# Patient Record
Sex: FEMALE | Race: WHITE | Employment: STUDENT | URBAN - METROPOLITAN AREA
[De-identification: names, ages, dates, MRNs, and addresses within clinical notes are randomized per-mention and may not be internally consistent; named-entity substitution may affect disease eponyms.]

---
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
## 2021-01-14 NOTE — PATIENT INSTRUCTIONS
Start Adderall XR 10 mg 1 tablet daily in the morning. Take per directions. Healthy diet. Stay active. Start counseling.

## 2021-01-19 ENCOUNTER — MED REC SCAN ONLY (OUTPATIENT)
Dept: FAMILY MEDICINE CLINIC | Facility: CLINIC | Age: 21
End: 2021-01-19

## 2021-02-19 NOTE — PROGRESS NOTES
Sivan Thorne is a 24year old female. cc ADHD, anxiety, itchy back  HPI:   ADHD patient is taking medications regularly. Medication helps  with concentration and focusing . Patient is able to finish his tasks. Patient  Is having better motivation to no sore throat  Neck no neck pain  RESPIRATORY: denies shortness of breath with exertion  CARDIOVASCULAR: denies chest pain on exertion, no palpitations   GI: denies abdominal pain and denies heartburn  NEURO: denies headaches  Psych normal mood    EXAM: plan.  The patient is asked to return in 3 months. The note was dictated using speech recognition software. Accuracy and grammar in transcription may be subject to error.

## 2021-02-19 NOTE — PATIENT INSTRUCTIONS
Continue Adderall at current dose. Start counseling. Healthy diet. Keep good hydration. Use moisturizers to back regularly.

## 2021-06-17 RX ORDER — DEXTROAMPHETAMINE/AMPHETAMINE 10 MG
CAPSULE, EXT RELEASE 24 HR ORAL
Qty: 30 CAPSULE | Refills: 0 | Status: SHIPPED | OUTPATIENT
Start: 2021-06-17 | End: 2021-07-23

## 2021-06-17 NOTE — TELEPHONE ENCOUNTER
LOV: 2/19/21  Future Visit: 6/23/21  Last Rx: 4/22/21 30 caps 0 refills  Last Labs: 10/18/2018  Per protocol to provider

## 2021-06-23 NOTE — PATIENT INSTRUCTIONS
Start Vyvanse 20 mg 1 tablet in the morning. Do not take Adderall at the same time. Healthy diet. Keep good hydration.

## 2021-06-24 NOTE — PROGRESS NOTES
Kayleen Guevara is a 22year old female. cc ADHD, anxiety  HPI:   Patient is coming to the office for ADHD follow-up. She was started on Adderall XR 10 mg daily. Patient was doing well on medication.   Right now she is at home since mid May due to summ neck pain  RESPIRATORY: denies shortness of breath with exertion  CARDIOVASCULAR: denies chest pain on exertion  GI: denies abdominal pain and denies heartburn  NEURO: denies headaches  Psych normal mood.     EXAM:   /74 (BP Location: Left arm, [place]

## 2021-07-06 ENCOUNTER — TELEPHONE (OUTPATIENT)
Dept: FAMILY MEDICINE CLINIC | Facility: CLINIC | Age: 21
End: 2021-07-06

## 2021-07-18 ENCOUNTER — PATIENT MESSAGE (OUTPATIENT)
Dept: FAMILY MEDICINE CLINIC | Facility: CLINIC | Age: 21
End: 2021-07-18

## 2021-07-22 RX ORDER — DEXTROAMPHETAMINE/AMPHETAMINE 10 MG
CAPSULE, EXT RELEASE 24 HR ORAL
Qty: 30 CAPSULE | Refills: 0 | OUTPATIENT
Start: 2021-07-22

## 2021-07-22 NOTE — TELEPHONE ENCOUNTER
Medication(s) to Refill:   Requested Prescriptions     Pending Prescriptions Disp Refills   • ADDERALL XR 10 MG Oral Capsule SR 24 Hr [Pharmacy Med Name: Adderall XR 10 mg capsule,extended release] 30 capsule 0     Sig: TAKE ONE CAPSULE BY MOUTH DAILY   •

## 2021-07-23 RX ORDER — DEXTROAMPHETAMINE SACCHARATE, AMPHETAMINE ASPARTATE MONOHYDRATE, DEXTROAMPHETAMINE SULFATE AND AMPHETAMINE SULFATE 2.5; 2.5; 2.5; 2.5 MG/1; MG/1; MG/1; MG/1
10 CAPSULE, EXTENDED RELEASE ORAL DAILY
Qty: 30 CAPSULE | Refills: 0 | Status: SHIPPED | OUTPATIENT
Start: 2021-07-23 | End: 2021-08-16

## 2021-07-23 NOTE — TELEPHONE ENCOUNTER
From: Gudelia Hunt  To: Tony Duke MD  Sent: 7/18/2021 11:02 AM CDT  Subject: Prescription Question    Hi Dr. Helga Downs,  The Vyvanse I was prescribed was too expensive (as we talked to you about a few weeks ago), and my current prescripti

## 2021-07-23 NOTE — TELEPHONE ENCOUNTER
LOV 06/23/21. Please see message below. I pended her past prescription of adderall to you. Please authorize if appropriate.

## 2021-08-16 RX ORDER — DEXTROAMPHETAMINE/AMPHETAMINE 10 MG
CAPSULE, EXT RELEASE 24 HR ORAL
Qty: 30 CAPSULE | Refills: 0 | Status: SHIPPED | OUTPATIENT
Start: 2021-08-16 | End: 2021-09-23

## 2021-08-16 NOTE — TELEPHONE ENCOUNTER
Given Vyvanse 6-23 but too expensive and changed back to Adderall 7-18. Was told with RTC 1 month, but back on original medication. When is patient due back now? One month Adderall pended. DISCHARGE

## 2021-09-22 NOTE — TELEPHONE ENCOUNTER
LOV: 6/23/21  Future Visit: none  Last Rx: 8/16/21 30 caps 0 refills  Last Labs: 6/26/17  Per protocol to provider

## 2021-09-23 RX ORDER — DEXTROAMPHETAMINE/AMPHETAMINE 10 MG
CAPSULE, EXT RELEASE 24 HR ORAL
Qty: 30 CAPSULE | Refills: 0 | Status: SHIPPED | OUTPATIENT
Start: 2021-09-23 | End: 2021-10-19

## 2021-10-19 RX ORDER — DEXTROAMPHETAMINE/AMPHETAMINE 10 MG
CAPSULE, EXT RELEASE 24 HR ORAL
Qty: 30 CAPSULE | Refills: 0 | Status: SHIPPED | OUTPATIENT
Start: 2021-10-19

## 2021-10-19 NOTE — TELEPHONE ENCOUNTER
Adderall XR 10 mg capsule,extended release    LOV: 06/23/2021 for ADHD    UPCOMING APPT:N/A    LAST REFILL: 09/23/2021  QTY:  30/ 0 REFILLS    Sent [employer] message to make an appt. RX pended, please review if applicable. Thank You!

## 2021-11-24 ENCOUNTER — HOSPITAL ENCOUNTER (OUTPATIENT)
Dept: GENERAL RADIOLOGY | Age: 21
Discharge: HOME | End: 2021-11-24
Attending: FAMILY MEDICINE
Payer: COMMERCIAL

## 2021-11-24 ENCOUNTER — OFFICE VISIT (OUTPATIENT)
Dept: FAMILY MEDICINE CLINIC | Facility: CLINIC | Age: 21
End: 2021-11-24
Payer: COMMERCIAL

## 2021-11-24 VITALS
BODY MASS INDEX: 22.55 KG/M2 | WEIGHT: 137 LBS | TEMPERATURE: 98 F | RESPIRATION RATE: 18 BRPM | SYSTOLIC BLOOD PRESSURE: 108 MMHG | DIASTOLIC BLOOD PRESSURE: 72 MMHG | HEART RATE: 97 BPM | HEIGHT: 65.5 IN | OXYGEN SATURATION: 99 %

## 2021-11-24 DIAGNOSIS — R05.9 COUGH: ICD-10-CM

## 2021-11-24 DIAGNOSIS — R09.89 CHEST CONGESTION: ICD-10-CM

## 2021-11-24 DIAGNOSIS — R05.9 COUGH: Primary | ICD-10-CM

## 2021-11-24 PROCEDURE — 3008F BODY MASS INDEX DOCD: CPT | Performed by: FAMILY MEDICINE

## 2021-11-24 PROCEDURE — 71046 X-RAY EXAM CHEST 2 VIEWS: CPT | Performed by: FAMILY MEDICINE

## 2021-11-24 PROCEDURE — 3074F SYST BP LT 130 MM HG: CPT | Performed by: FAMILY MEDICINE

## 2021-11-24 PROCEDURE — 99214 OFFICE O/P EST MOD 30 MIN: CPT | Performed by: FAMILY MEDICINE

## 2021-11-24 PROCEDURE — 3078F DIAST BP <80 MM HG: CPT | Performed by: FAMILY MEDICINE

## 2021-11-24 RX ORDER — AZITHROMYCIN 250 MG/1
TABLET, FILM COATED ORAL
Qty: 6 TABLET | Refills: 0 | Status: SHIPPED | OUTPATIENT
Start: 2021-11-24 | End: 2021-11-29

## 2021-11-24 RX ORDER — ALBUTEROL SULFATE 90 UG/1
AEROSOL, METERED RESPIRATORY (INHALATION)
COMMUNITY
Start: 2021-11-12 | End: 2021-12-17 | Stop reason: ALTCHOICE

## 2021-11-24 NOTE — PROGRESS NOTES
Nadir Farfan is a 22year old female. cc cough, chest congestion  HPI:   Patient is coming to the office for follow-up from urgent care visit on virtual visit she had for her illness. Patient was diagnosed with Covid on October 14, 2021.   She [place] denies abdominal pain and denies heartburn  NEURO: denies headaches  Psych - normal mood,     EXAM:   /72 (BP Location: Left arm, Patient Position: Sitting, Cuff Size: adult)   Pulse 97   Temp 97.6 °F (36.4 °C) (Oral)   Resp 18   Ht 5' 5.5\" (1.664 m Cough       COMPARISON:  None.       TECHNIQUE:  PA and lateral chest radiographs were obtained.       PATIENT STATED HISTORY: (As transcribed by Technologist)  Pt c/o productive cough and chest congestion for about 3 weeks.            FINDINGS:     LUNGS:

## 2021-11-24 NOTE — PROGRESS NOTES
Covi 10/13 positive, Nov 7 th cough, wet, no fever, 11/11- online - CX- no pneumonia,   Albuterol inhaler, Mucinex,

## 2021-11-24 NOTE — PATIENT INSTRUCTIONS
Do x-ray of your chest.  Start antibiotic today per directions. Take probiotic over-the-counter daily like organic yogurt while taking antibiotic. Drink plenty of fluids. Take Tylenol or ibuprofen as needed for fever or for pain.     Flonase 2 sprays nos

## 2021-12-17 RX ORDER — METHYLPHENIDATE HYDROCHLORIDE 18 MG/1
TABLET ORAL
Qty: 30 TABLET | Refills: 0 | OUTPATIENT
Start: 2021-12-17

## 2021-12-17 NOTE — PATIENT INSTRUCTIONS
Start escitalopram 5 mg 1 tablet daily. Start Concerta 18 mg 1 tablet daily/as needed 1 week later. Keep good hydration. Healthy diet. Follow-up beginning of February 2022.

## 2021-12-17 NOTE — PROGRESS NOTES
Jose Luis Boykin is a 22year old female. Cc ADHD, anxiety  HPI:   Patient is coming for follow-up of ADHD. Started on Adderall XR 10 mg 1 tablet daily. Noticed that the medication makes her more anxious.   There are times which she is doing okay but No       REVIEW OF SYSTEMS:   GENERAL HEALTH: feels well otherwise  SKIN: denies any unusual skin lesions or rashes  HEENT no congestion no runny nose no sore throat  Neck no neck pain  RESPIRATORY: denies shortness of breath with exertion  CARDIOVASCULAR: & Consults:  FLULAVAL INFLUENZA VACCINE QUAD PRESERVATIVE FREE 0.5 ML    The patient indicates understanding of these issues and agrees to the plan. The patient is asked to return in beginning of February 2022. Sooner as needed.   The note was dictated us

## 2022-02-01 ENCOUNTER — PATIENT MESSAGE (OUTPATIENT)
Dept: FAMILY MEDICINE CLINIC | Facility: CLINIC | Age: 22
End: 2022-02-01

## 2022-02-01 RX ORDER — ESCITALOPRAM OXALATE 5 MG/1
TABLET ORAL
Qty: 30 TABLET | Refills: 0 | Status: SHIPPED | OUTPATIENT
Start: 2022-02-01 | End: 2022-03-18

## 2022-02-01 NOTE — TELEPHONE ENCOUNTER
escitalopram 5 mg tablet    Please see pended medications. Please sign if appropriate.       Thank you      Last OV: 12/17/2021 for 30/ refill      Last refill: 12/17/2021

## 2022-02-02 NOTE — TELEPHONE ENCOUNTER
From: Gal Cronin  To: Vasiliy Velasco MD  Sent: 2/1/2022 5:21 PM CST  Subject: Concerta Refill    Hi Dr. Katty Fuchs,  I called [facility] today and tried to refill my Concerta prescription, but they said that they were not able to contact you to request a refill because it had recently been denied. I just looked on MyChart and also noticed that it is not listed as one of my prescriptions. Would it be possible for you to send in a refill to [facility]? Thank you!   Ale Malik

## 2022-02-02 NOTE — TELEPHONE ENCOUNTER
Last OV 12/17/2021 for ADHD. Last refill 12/17/2022 #30 without refills. Pt is due for a follow up for her Escitalopram.  Will call pt to make a follow up appt. Would you like to refill this med prior to appt? Please advise. Thanks.

## 2022-02-03 ENCOUNTER — TELEPHONE (OUTPATIENT)
Dept: FAMILY MEDICINE CLINIC | Facility: CLINIC | Age: 22
End: 2022-02-03

## 2022-02-03 RX ORDER — METHYLPHENIDATE HYDROCHLORIDE 18 MG/1
18 TABLET ORAL EVERY MORNING
Qty: 30 TABLET | Refills: 0 | Status: SHIPPED | OUTPATIENT
Start: 2022-02-03 | End: 2022-03-05

## 2022-02-03 NOTE — TELEPHONE ENCOUNTER
I have called in refill for methylphenidate to patient's pharmacy. We have received a note that she will be this weekend in [place]. I am in the office on Saturday, February 5. Please offer her appointment on the day I can see her at 8:45 AM or at noon. She is due for medication check and it might be easier for her to do it at this time.   Thank you

## 2022-02-03 NOTE — TELEPHONE ENCOUNTER
Pt calling regarding refill request for Methylphenidate HCl ER (CONCERTA) 18 MG Oral Tab CR. Pt states attempted to refill via pharmacy and MyChart but was unable to see medication. 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 Pt requesting refill so pt can get medication this weekend as she is almost out.     Please advise

## 2022-02-03 NOTE — TELEPHONE ENCOUNTER
I tried to call pt. Her voice mail box is full and I was unable to leave a message. I sent a [facility] to pt to call the office about an appointment this Saturday.

## 2022-02-03 NOTE — TELEPHONE ENCOUNTER
Pt calling back regarding [facility] that was sent. States she will not be in [place] this weekend . Pt's father live in [place] and will be picking up prescription to bring to pt's college this weekend. Pt will not be back in [place] for a while. Advised Dr Amy Zamora scheduling out anyway's and we can make appointment for next avail. Pt agreed will make appointment but is going into class so will call back or schedule via [employer].     Please advise

## 2022-03-14 ENCOUNTER — TELEPHONE (OUTPATIENT)
Dept: FAMILY MEDICINE CLINIC | Facility: CLINIC | Age: 22
End: 2022-03-14

## 2022-03-14 NOTE — TELEPHONE ENCOUNTER
Call to Mom/Martha. Reaches identified VM. Per HIPAA consent, LVM requesting call back to ofc tomorrow to confirm if appt on Friday 3/18/22 at 12:00 PM works for pt. Provided call back number and ofc phone hours.

## 2022-03-14 NOTE — TELEPHONE ENCOUNTER
Pt will be in from college for only one day,  Friday, 3/18/2022. Caller asking if pt can be seen for a med check. Please advise.

## 2022-03-14 NOTE — TELEPHONE ENCOUNTER
Pt was scheduled for 9:30 AM on 3/18, pt mom called back to request a later appointment time. States pt will be having a meeting at 10:00 AM and is requesting an appointment time for pt after 11:00 AM.     Please advise if this is possible?

## 2022-04-05 ENCOUNTER — TELEPHONE (OUTPATIENT)
Dept: FAMILY MEDICINE CLINIC | Facility: CLINIC | Age: 22
End: 2022-04-05

## 2022-04-05 RX ORDER — METHYLPHENIDATE HYDROCHLORIDE 18 MG/1
18 TABLET ORAL DAILY
Qty: 30 TABLET | Refills: 0 | Status: SHIPPED | OUTPATIENT
Start: 2022-04-18 | End: 2022-05-18

## 2022-04-05 NOTE — TELEPHONE ENCOUNTER
See below. 3 mos panel sent 3.18  Reporting bottle is lost.  Are you OK in refilling? Pt may need to pay out of pocket since too soon. I pended.

## 2022-04-05 NOTE — TELEPHONE ENCOUNTER
Pt calling regarding prescription of Methylphenidate HCl ER (CONCERTA) 18 MG Oral Tab CR. Pt states while away at camp trip she lost her medication. Pt states attempted to call location, however they were unable to locate pts medication. Pt states unaware how much she had left, however has been off of medication since Saturday. Pt requesting refill if possible. Per pt okay to leave VM if does not answer.     Please advise

## 2022-04-05 NOTE — TELEPHONE ENCOUNTER
I have called new prescription for methylphenidate as requested. It is a controlled substance which is regulated by state rules. If the state would not allow her to fill of the medication she would have to wait for the next time she can fill the prescription to get the medication. In the future be very careful,  if she loses the medication she would not be able to continue taking it until new prescription will be released by the pharmacy. We do not have any  power to override those restrictions.   Thank you

## 2022-06-17 RX ORDER — ESCITALOPRAM OXALATE 5 MG/1
5 TABLET ORAL DAILY
Qty: 30 TABLET | Refills: 0 | Status: SHIPPED | OUTPATIENT
Start: 2022-06-17

## 2022-06-17 NOTE — TELEPHONE ENCOUNTER
escitalopram 5 mg tablet    Please see pended medications. Please sign if appropriate. Thank you        Last OV: 03/18/2022      Last refill: 03/18/2022 for 30/2 refills      Pt asked to RTC in the Summer of 2022. [facility] sent to the pt to call and schedule her appt.

## 2022-07-11 RX ORDER — ESCITALOPRAM OXALATE 5 MG/1
TABLET ORAL
Qty: 30 TABLET | Refills: 0 | Status: SHIPPED | OUTPATIENT
Start: 2022-07-11

## 2022-07-27 RX ORDER — METHYLPHENIDATE HYDROCHLORIDE 18 MG/1
TABLET ORAL
Qty: 15 TABLET | Refills: 0 | Status: SHIPPED | OUTPATIENT
Start: 2022-07-27

## 2022-08-03 NOTE — PATIENT INSTRUCTIONS
Continue Concerta at current dose. Continue citalopram at current dose. Healthy diet. Stay active. Do blood work. Schedule your next follow-up visit around Thanksgiving time.

## 2022-08-15 ENCOUNTER — TELEPHONE (OUTPATIENT)
Dept: FAMILY MEDICINE CLINIC | Facility: CLINIC | Age: 22
End: 2022-08-15

## 2022-08-15 NOTE — TELEPHONE ENCOUNTER
Pt received a 15 day supply of Concerta in July because she was over due to be seen. Now the insurance company is denying the 30 day supply. I called "[employer] at [phone] ID # [id number] and explained the situation to them. I spoke to Ruperto hernandez and she said she would have to initiate a back on track request. This will take about 24 hours. [facility] pharmacy was notified.

## 2022-12-22 ENCOUNTER — LAB ENCOUNTER (OUTPATIENT)
Dept: LAB | Age: 22
End: 2022-12-22
Attending: FAMILY MEDICINE
Payer: COMMERCIAL

## 2022-12-22 DIAGNOSIS — R68.89 COLD FEELING: ICD-10-CM

## 2022-12-22 DIAGNOSIS — D64.9 ANEMIA, UNSPECIFIED TYPE: Primary | ICD-10-CM

## 2022-12-22 DIAGNOSIS — D64.9 ANEMIA, UNSPECIFIED TYPE: ICD-10-CM

## 2022-12-22 LAB
ALBUMIN SERPL-MCNC: 4 G/DL (ref 3.4–5)
ALBUMIN/GLOB SERPL: 1.3 {RATIO} (ref 1–2)
ALP LIVER SERPL-CCNC: 61 U/L
ALT SERPL-CCNC: 18 U/L
ANION GAP SERPL CALC-SCNC: 5 MMOL/L (ref 0–18)
AST SERPL-CCNC: 12 U/L (ref 15–37)
BASOPHILS # BLD AUTO: 0.03 X10(3) UL (ref 0–0.2)
BASOPHILS NFR BLD AUTO: 0.7 %
BILIRUB SERPL-MCNC: 0.3 MG/DL (ref 0.1–2)
BUN BLD-MCNC: 15 MG/DL (ref 7–18)
BUN/CREAT SERPL: 22.1 (ref 10–20)
CALCIUM BLD-MCNC: 8.9 MG/DL (ref 8.5–10.1)
CHLORIDE SERPL-SCNC: 109 MMOL/L (ref 98–112)
CO2 SERPL-SCNC: 25 MMOL/L (ref 21–32)
CREAT BLD-MCNC: 0.68 MG/DL
DEPRECATED HBV CORE AB SER IA-ACNC: 3.8 NG/ML
DEPRECATED RDW RBC AUTO: 41.1 FL (ref 35.1–46.3)
EOSINOPHIL # BLD AUTO: 0.06 X10(3) UL (ref 0–0.7)
EOSINOPHIL NFR BLD AUTO: 1.3 %
ERYTHROCYTE [DISTWIDTH] IN BLOOD BY AUTOMATED COUNT: 13.3 % (ref 11–15)
FASTING STATUS PATIENT QL REPORTED: YES
GFR SERPLBLD BASED ON 1.73 SQ M-ARVRAT: 127 ML/MIN/1.73M2 (ref 60–?)
GLOBULIN PLAS-MCNC: 3 G/DL (ref 2.8–4.4)
GLUCOSE BLD-MCNC: 87 MG/DL (ref 70–99)
HCT VFR BLD AUTO: 37.3 %
HGB BLD-MCNC: 11.8 G/DL
IMM GRANULOCYTES # BLD AUTO: 0.01 X10(3) UL (ref 0–1)
IMM GRANULOCYTES NFR BLD: 0.2 %
IRON SATN MFR SERPL: 7 %
IRON SERPL-MCNC: 29 UG/DL
LYMPHOCYTES # BLD AUTO: 1.68 X10(3) UL (ref 1–4)
LYMPHOCYTES NFR BLD AUTO: 37.4 %
MCH RBC QN AUTO: 26.8 PG (ref 26–34)
MCHC RBC AUTO-ENTMCNC: 31.6 G/DL (ref 31–37)
MCV RBC AUTO: 84.8 FL
MONOCYTES # BLD AUTO: 0.51 X10(3) UL (ref 0.1–1)
MONOCYTES NFR BLD AUTO: 11.4 %
NEUTROPHILS # BLD AUTO: 2.2 X10 (3) UL (ref 1.5–7.7)
NEUTROPHILS # BLD AUTO: 2.2 X10(3) UL (ref 1.5–7.7)
NEUTROPHILS NFR BLD AUTO: 49 %
OSMOLALITY SERPL CALC.SUM OF ELEC: 288 MOSM/KG (ref 275–295)
PLATELET # BLD AUTO: 213 10(3)UL (ref 150–450)
POTASSIUM SERPL-SCNC: 4 MMOL/L (ref 3.5–5.1)
PROT SERPL-MCNC: 7 G/DL (ref 6.4–8.2)
RBC # BLD AUTO: 4.4 X10(6)UL
SODIUM SERPL-SCNC: 139 MMOL/L (ref 136–145)
TIBC SERPL-MCNC: 446 UG/DL (ref 240–450)
TRANSFERRIN SERPL-MCNC: 299 MG/DL (ref 200–360)
TSI SER-ACNC: 1.33 MIU/ML (ref 0.36–3.74)
WBC # BLD AUTO: 4.5 X10(3) UL (ref 4–11)

## 2022-12-22 PROCEDURE — 83540 ASSAY OF IRON: CPT

## 2022-12-22 PROCEDURE — 82728 ASSAY OF FERRITIN: CPT

## 2022-12-22 PROCEDURE — 84443 ASSAY THYROID STIM HORMONE: CPT

## 2022-12-22 PROCEDURE — 84466 ASSAY OF TRANSFERRIN: CPT

## 2022-12-22 PROCEDURE — 80053 COMPREHEN METABOLIC PANEL: CPT

## 2022-12-22 PROCEDURE — 85025 COMPLETE CBC W/AUTO DIFF WBC: CPT

## 2022-12-23 ENCOUNTER — TELEPHONE (OUTPATIENT)
Dept: FAMILY MEDICINE CLINIC | Facility: CLINIC | Age: 22
End: 2022-12-23

## 2022-12-23 DIAGNOSIS — D50.9 IRON DEFICIENCY ANEMIA, UNSPECIFIED IRON DEFICIENCY ANEMIA TYPE: Primary | ICD-10-CM

## 2022-12-23 RX ORDER — FERROUS SULFATE 325(65) MG
325 TABLET ORAL
COMMUNITY
Start: 2022-12-23

## 2022-12-23 RX ORDER — ASCORBIC ACID 500 MG
500 TABLET ORAL DAILY
COMMUNITY
Start: 2022-12-23

## 2023-05-05 NOTE — PROGRESS NOTES
Carissa Bermeo is a 24year old female. cc discuss test results from psychologist.  HPI:   Patient come to the office to discuss test results from psychologist Dr. Loir Recinos. She had those test done yesterday.   It showed that the patient has symptoms co problems.     EXAM:   /70 (BP Location: Right arm, Patient Position: Sitting, Cuff Size: adult)   Pulse 89   Temp 97.5 °F (36.4 °C) (Oral)   Resp 16   Ht 5' 5.5\" (1.664 m)   Wt 140 lb (63.5 kg)   LMP 11/26/2020 (Approximate)   Breastfeeding No   BMI Xenograft Text: The defect edges were debeveled with a #15 scalpel blade.  Given the location of the defect, shape of the defect and the proximity to free margins a xenograft was deemed most appropriate.  The graft was then trimmed to fit the size of the defect.  The graft was then placed in the primary defect and oriented appropriately.

## 2023-08-09 RX ORDER — ESCITALOPRAM OXALATE 5 MG/1
5 TABLET ORAL DAILY
Qty: 90 TABLET | Refills: 0 | OUTPATIENT
Start: 2023-08-09

## 2023-08-09 NOTE — TELEPHONE ENCOUNTER
Pt requesting a refill for       escitalopram 5 MG Oral Tab 90 tablet 1 11/25/2022    Sig:   Take 1 tablet (5 mg total) by mouth daily.      Route:   Oral     Order #:   [id number]         Pharmacy    [facility], [address], [phone]

## 2023-09-01 ENCOUNTER — LAB ENCOUNTER (OUTPATIENT)
Dept: LAB | Age: 23
End: 2023-09-01
Attending: FAMILY MEDICINE
Payer: COMMERCIAL

## 2023-09-01 DIAGNOSIS — D50.9 IRON DEFICIENCY ANEMIA, UNSPECIFIED IRON DEFICIENCY ANEMIA TYPE: ICD-10-CM

## 2023-09-01 LAB
BASOPHILS # BLD AUTO: 0.03 X10(3) UL (ref 0–0.2)
BASOPHILS NFR BLD AUTO: 0.7 %
DEPRECATED HBV CORE AB SER IA-ACNC: 4.5 NG/ML
EOSINOPHIL # BLD AUTO: 0.07 X10(3) UL (ref 0–0.7)
EOSINOPHIL NFR BLD AUTO: 1.6 %
ERYTHROCYTE [DISTWIDTH] IN BLOOD BY AUTOMATED COUNT: 13.4 %
HCT VFR BLD AUTO: 41.8 %
HGB BLD-MCNC: 13.8 G/DL
IMM GRANULOCYTES # BLD AUTO: 0.01 X10(3) UL (ref 0–1)
IMM GRANULOCYTES NFR BLD: 0.2 %
IRON SATN MFR SERPL: 18 %
IRON SERPL-MCNC: 92 UG/DL
LYMPHOCYTES # BLD AUTO: 1.47 X10(3) UL (ref 1–4)
LYMPHOCYTES NFR BLD AUTO: 32.8 %
MCH RBC QN AUTO: 28.3 PG (ref 26–34)
MCHC RBC AUTO-ENTMCNC: 33 G/DL (ref 31–37)
MCV RBC AUTO: 85.8 FL
MONOCYTES # BLD AUTO: 0.64 X10(3) UL (ref 0.1–1)
MONOCYTES NFR BLD AUTO: 14.3 %
NEUTROPHILS # BLD AUTO: 2.26 X10 (3) UL (ref 1.5–7.7)
NEUTROPHILS # BLD AUTO: 2.26 X10(3) UL (ref 1.5–7.7)
NEUTROPHILS NFR BLD AUTO: 50.4 %
PLATELET # BLD AUTO: 232 10(3)UL (ref 150–450)
RBC # BLD AUTO: 4.87 X10(6)UL
TIBC SERPL-MCNC: 504 UG/DL (ref 240–450)
TRANSFERRIN SERPL-MCNC: 338 MG/DL (ref 200–360)
WBC # BLD AUTO: 4.5 X10(3) UL (ref 4–11)

## 2023-09-01 PROCEDURE — 83540 ASSAY OF IRON: CPT

## 2023-09-01 PROCEDURE — 83550 IRON BINDING TEST: CPT

## 2023-09-01 PROCEDURE — 85025 COMPLETE CBC W/AUTO DIFF WBC: CPT

## 2023-09-01 PROCEDURE — 82728 ASSAY OF FERRITIN: CPT

## 2023-09-04 ENCOUNTER — PATIENT MESSAGE (OUTPATIENT)
Dept: FAMILY MEDICINE CLINIC | Facility: CLINIC | Age: 23
End: 2023-09-04

## 2023-09-05 NOTE — TELEPHONE ENCOUNTER
I am so sorry , not sure how it happened. I called in today prescription for Lexapro 10 mg to her pharmacy. If she has 5 mg she can use it up by taking 2 of those a day(  which was going to give her total dose for the of 10 mg.). Thanks.

## 2023-09-05 NOTE — TELEPHONE ENCOUNTER
From: Fidel Nur  To: Marialuisa Sifuentes MD  Sent: 9/4/2023 1:54 PM CDT  Subject: Madison Nieto,     At our appointment on Friday, 9/1, you said you sent information to [address] [place] [address] about increasing my dose of Lexapro to 10mg. I filled the prescription right after my appointment, and they gave me 30 days of 5mg. Is this correct? Thank you!   Therese Sneed

## 2024-01-30 ENCOUNTER — LAB ENCOUNTER (OUTPATIENT)
Dept: LAB | Age: 24
End: 2024-01-30
Attending: FAMILY MEDICINE
Payer: COMMERCIAL

## 2024-01-30 DIAGNOSIS — D50.9 IRON DEFICIENCY ANEMIA, UNSPECIFIED IRON DEFICIENCY ANEMIA TYPE: ICD-10-CM

## 2024-01-30 LAB
BASOPHILS # BLD AUTO: 0.05 X10(3) UL (ref 0–0.2)
BASOPHILS NFR BLD AUTO: 1.2 %
DEPRECATED HBV CORE AB SER IA-ACNC: 4.8 NG/ML
EOSINOPHIL # BLD AUTO: 0.09 X10(3) UL (ref 0–0.7)
EOSINOPHIL NFR BLD AUTO: 2.2 %
ERYTHROCYTE [DISTWIDTH] IN BLOOD BY AUTOMATED COUNT: 13.4 %
HCT VFR BLD AUTO: 38 %
HGB BLD-MCNC: 12.4 G/DL
IMM GRANULOCYTES # BLD AUTO: 0.01 X10(3) UL (ref 0–1)
IMM GRANULOCYTES NFR BLD: 0.2 %
IRON SATN MFR SERPL: 18 %
IRON SERPL-MCNC: 84 UG/DL
LYMPHOCYTES # BLD AUTO: 1.61 X10(3) UL (ref 1–4)
LYMPHOCYTES NFR BLD AUTO: 40 %
MCH RBC QN AUTO: 27.1 PG (ref 26–34)
MCHC RBC AUTO-ENTMCNC: 32.6 G/DL (ref 31–37)
MCV RBC AUTO: 83.2 FL
MONOCYTES # BLD AUTO: 0.42 X10(3) UL (ref 0.1–1)
MONOCYTES NFR BLD AUTO: 10.4 %
NEUTROPHILS # BLD AUTO: 1.85 X10 (3) UL (ref 1.5–7.7)
NEUTROPHILS # BLD AUTO: 1.85 X10(3) UL (ref 1.5–7.7)
NEUTROPHILS NFR BLD AUTO: 46 %
PLATELET # BLD AUTO: 220 10(3)UL (ref 150–450)
RBC # BLD AUTO: 4.57 X10(6)UL
TIBC SERPL-MCNC: 472 UG/DL (ref 240–450)
TRANSFERRIN SERPL-MCNC: 317 MG/DL (ref 200–360)
WBC # BLD AUTO: 4 X10(3) UL (ref 4–11)

## 2024-01-30 PROCEDURE — 85025 COMPLETE CBC W/AUTO DIFF WBC: CPT

## 2024-01-30 PROCEDURE — 83550 IRON BINDING TEST: CPT

## 2024-01-30 PROCEDURE — 83540 ASSAY OF IRON: CPT

## 2024-01-30 PROCEDURE — 82728 ASSAY OF FERRITIN: CPT

## 2024-04-22 ENCOUNTER — OFFICE VISIT (OUTPATIENT)
Dept: FAMILY MEDICINE CLINIC | Facility: CLINIC | Age: 24
End: 2024-04-22
Payer: COMMERCIAL

## 2024-04-22 VITALS
SYSTOLIC BLOOD PRESSURE: 112 MMHG | WEIGHT: 154.81 LBS | HEART RATE: 96 BPM | BODY MASS INDEX: 25.48 KG/M2 | HEIGHT: 65.5 IN | TEMPERATURE: 97 F | DIASTOLIC BLOOD PRESSURE: 70 MMHG | RESPIRATION RATE: 18 BRPM

## 2024-04-22 DIAGNOSIS — Z00.00 PHYSICAL EXAM, ANNUAL: Primary | ICD-10-CM

## 2024-04-22 DIAGNOSIS — Z13.89 SCREENING FOR GENITOURINARY CONDITION: ICD-10-CM

## 2024-04-22 DIAGNOSIS — F41.1 GAD (GENERALIZED ANXIETY DISORDER): ICD-10-CM

## 2024-04-22 DIAGNOSIS — Z00.00 LABORATORY EXAMINATION ORDERED AS PART OF A ROUTINE GENERAL MEDICAL EXAMINATION: ICD-10-CM

## 2024-04-22 DIAGNOSIS — D50.9 IRON DEFICIENCY ANEMIA, UNSPECIFIED IRON DEFICIENCY ANEMIA TYPE: ICD-10-CM

## 2024-04-22 PROCEDURE — 3078F DIAST BP <80 MM HG: CPT | Performed by: FAMILY MEDICINE

## 2024-04-22 PROCEDURE — 96127 BRIEF EMOTIONAL/BEHAV ASSMT: CPT | Performed by: FAMILY MEDICINE

## 2024-04-22 PROCEDURE — 99213 OFFICE O/P EST LOW 20 MIN: CPT | Performed by: FAMILY MEDICINE

## 2024-04-22 PROCEDURE — 99395 PREV VISIT EST AGE 18-39: CPT | Performed by: FAMILY MEDICINE

## 2024-04-22 PROCEDURE — 3074F SYST BP LT 130 MM HG: CPT | Performed by: FAMILY MEDICINE

## 2024-04-22 PROCEDURE — 90471 IMMUNIZATION ADMIN: CPT | Performed by: FAMILY MEDICINE

## 2024-04-22 PROCEDURE — 3008F BODY MASS INDEX DOCD: CPT | Performed by: FAMILY MEDICINE

## 2024-04-22 PROCEDURE — 90651 9VHPV VACCINE 2/3 DOSE IM: CPT | Performed by: FAMILY MEDICINE

## 2024-04-22 RX ORDER — ESCITALOPRAM OXALATE 10 MG/1
10 TABLET ORAL DAILY
Qty: 30 TABLET | Refills: 5 | Status: SHIPPED | OUTPATIENT
Start: 2024-04-22

## 2024-04-22 NOTE — PROGRESS NOTES
HPI:   Fallon Kessler is a 24 year old female who presents for a complete physical exam. Symptoms: denies discharge, itching, burning or dysuria. Patient got new job.  Excited about that.  Was planning for [employer].    Patient is taking escitalopram 10 mg for anxiety.  She is doing well mood is stable.  Happy with getting new job.  Needs refill.  PHQ-9 score came back at 0.  ANDREW-7 patient says that over the last week several days she is feeling nervous anxious on the edge.  ANDREW-7 score came back at 1.      Had low iron before.  Does not take supplements regularly check blood work for recommendation pending test results.  Needs HPV vaccination update.  Immunization History   Administered Date(s) Administered    Covid-19 Vaccine Pfizer 30 mcg/0.3 ml 03/22/2021, 04/10/2021, 01/13/2022    Covid-19 Vaccine Pfizer Bivalent 30mcg/0.3mL 03/06/2023    DTAP 05/23/2001, 07/25/2001, 10/03/2001, 10/16/2002, 04/03/2006    FLULAVAL 6 months & older 0.5 ml Prefilled syringe (99064) 12/17/2021, 11/25/2022    FLUZONE 6 months and older PFS 0.5 ml ([id number]) 10/29/2018, 11/20/2023    HEP A 04/05/2010, 04/18/2012    HEP B 05/23/2001, 07/25/2001, 10/16/2002    HIB 05/23/2001, 07/25/2001, 10/03/2001, 06/28/2002    Hpv Virus Vaccine 9 Vee Im 08/03/2022    Influenza 11/04/2011    Influenza(Afluria)0.5ml QIV PFS 09/12/2020    MMR 06/28/2002, 04/03/2006    Meningococcal-Menactra 04/18/2012, 07/27/2018    OPV 05/23/2001, 07/25/2001, 10/16/2002, 04/03/2006    TDAP 04/18/2012, 08/03/2022      Wt Readings from Last 6 Encounters:   11/20/23 145 lb (65.8 kg)   09/01/23 137 lb (62.1 kg)   11/25/22 133 lb (60.3 kg)   08/03/22 129 lb (58.5 kg)   03/18/22 131 lb (59.4 kg)   12/17/21 133 lb 6 oz (60.5 kg)     There is no height or weight on file to calculate BMI.     AST (U/L)   Date Value   12/22/2022 12 (L)   06/26/2017 26   02/15/2017 22     ALT (U/L)   Date Value   12/22/2022 18   06/26/2017 20   02/15/2017 21      No results found for:  \"GLUCOSE\"     Current Outpatient Medications   Medication Sig Dispense Refill    escitalopram 10 MG Oral Tab Take 1 tablet (10 mg total) by mouth daily. 30 tablet 5    Ferrous Sulfate 325 (65 Fe) MG Oral Tab Take 1 tablet (325 mg total) by mouth daily with breakfast.      Vitamin C 500 MG Oral Tab Take 1 tablet (500 mg total) by mouth daily.        Past Medical History:    Varicella without mention of complication      Past Surgical History:   Procedure Laterality Date    Remove tonsils/adenoids,<12 y/o  12/29/09    Performed by POLLY TEMPLETON at [facility], [facility]    Tonsillectomy        No family history on file.   Social History:   Social History     Socioeconomic History    Marital status: Single   Tobacco Use    Smoking status: Never    Smokeless tobacco: Never   Vaping Use    Vaping status: Never Used   Substance and Sexual Activity    Alcohol use: No    Drug use: No    Sexual activity: Never   Other Topics Concern    Caffeine Concern No    Exercise Yes     Comment: daily in school    Seat Belt Yes     Occ:office.  no Children: none  Exercise: walking.  Diet: watches calories closely     REVIEW OF SYSTEMS:   GENERAL: feels well otherwise  SKIN: denies any unusual skin lesions  EYES:denies blurred vision or double vision  HEENT: denies nasal congestion, sinus pain or ST  LUNGS: denies shortness of breath with exertion  CARDIOVASCULAR: denies chest pain on exertion  GI: denies abdominal pain,denies heartburn  : denies dysuria, vaginal discharge or itching,periods regular   MUSCULOSKELETAL: denies back pain  NEURO: denies headaches  PSYCHE: denies depression or anxiety  HEMATOLOGIC: denies hx of anemia  ENDOCRINE: denies thyroid history  ALL/ASTHMA: denies hx of allergy or asthma    EXAM:   There were no vitals taken for this visit.  There is no height or weight on file to calculate BMI.   GENERAL: well developed, well nourished,in no apparent distress  SKIN: no rashes,no suspicious  lesions  HEENT: atraumatic, normocephalic,ears and throat are clear  EYES:PERRLA, EOMI, normal optic disk,conjunctiva are clear  NECK: supple,no adenopathy,no bruits  CHEST: no chest tenderness  BREAST: no dominant or suspicious mass  LUNGS: clear to auscultation  CARDIO: RRR without murmur  GI: good BS's,no masses, HSM or tenderness  :deferred  RECTAL: Deferred  MUSCULOSKELETAL: back is not tender,FROM of the back  EXTREMITIES: no cyanosis, clubbing or edema  NEURO: Oriented times three,cranial nerves are intact,motor and sensory are grossly intact    ASSESSMENT AND PLAN:   Fallon Kessler is a 24 year old female who presents for a complete physical exam.  Encounter Diagnoses   Name Primary?    Physical exam, annual Yes    Laboratory examination ordered as part of a routine general medical examination     Iron deficiency anemia, unspecified iron deficiency anemia type     Screening for genitourinary condition     ANDREW (generalized anxiety disorder)        Orders Placed This Encounter   Procedures    CBC With Differential With Platelet    Comp Metabolic Panel (14)    Iron And Tibc    Lipid Panel    Ferritin    Urinalysis with Culture Reflex    TSH W Reflex To Free T4    Human Papillomavirus 9-valent vaccine, Recombinant (Gardasil 9) HPV 9 [51205]       Meds & Refills for this Visit:  Requested Prescriptions     Signed Prescriptions Disp Refills    escitalopram 10 MG Oral Tab 30 tablet 5     Sig: Take 1 tablet (10 mg total) by mouth daily.     Call [phone] to schedule fasting labs.   Continue escitalopram at current dose.  Increase activity.  Healthy diet.  Keep good hydration.    Imaging & Consults:  HPV HUMAN PAPILLOMA VIRUS VACC 9 LAKE 3 DOSE IM   Pap and pelvic deferred . self breast exam explained. Health maintenance, will check fasting Lipids, CMP, and CBC. Pt  will be at 46 referred for screening colonoscopy. Pt' s weight is There is no height or weight on file to calculate BMI., recommended low carb  diet and aerobic exercise 30 minutes three times weekly.  The patient indicates understanding of these issues and agrees to the plan.  The patient is asked to return for CPX in 1 year.  Medication check in 6 months.

## 2024-04-22 NOTE — PATIENT INSTRUCTIONS
Call [phone] to schedule fasting labs.   Continue escitalopram at current dose.  Increase activity.  Healthy diet.  Keep good hydration.    Dear Patients,  At [facility], patient care is our priority. Please review and acknowledge our office refill policy so we can best serve you.  EMG 36 REFILL POLICY  Allow 3 business days for refills; controlled substances may take longer.  Contact your pharmacy at least 5-7 business days prior to running out of medication and have them send an electronic request or submit through the \"request refill\" option thru your [employer] account. No need to do both, as multiple requests will create an automated [employer] message to notify of a denial for one of the duplicated requests, causing you undue confusion.   Refills are NOT addressed on weekends; covering physicians do not authorize routine medications on weekends.  No narcotics or controlled substances are refilled after noon on Fridays or by on call physicians.  By law, narcotics cannot be faxed or phoned into your pharmacy.  If your prescription is due for a refill, you may be due for a follow up appointment. Please call our office at [phone] to make an appointment or schedule an appointment via [employer].  To best provide you care, patients receiving routine medications need to be seen at least twice a year. Patients receiving narcotic/controlled substance medications need to be seen at least once every 3 months.  In the event that your preferred pharmacy does not have the requested medication in stock (ie Backordered), it is your responsibility to find another pharmacy that has the requested medication available. We will gladly send a new prescription to that pharmacy at your request.  controlled substances may not be able to be filled out of state due to license restrictions.  If you have a planned trip, it's best to call your pharmacy at least 5-7 business days to prevent any delays in your medication  refill.    Thank you for your understanding of this important matter.  Sincerely,    [facility], [address] [place] [id number]  Dept: [phone]

## 2024-05-08 ENCOUNTER — HOSPITAL ENCOUNTER (EMERGENCY)
Facility: HOSPITAL | Age: 24
Discharge: LEFT BEFORE DISCHARGE | End: 2024-05-08
Payer: COMMERCIAL

## 2024-12-05 DIAGNOSIS — F41.1 GAD (GENERALIZED ANXIETY DISORDER): ICD-10-CM

## 2024-12-06 NOTE — TELEPHONE ENCOUNTER
[facility] sent to patient reminding her to schedule a med check.     Last Office Visit: 4/22/24  Last Refill: 4/22/24  Return to Clinic: 6 months   Protocol: failed   NOV: n/a   Requested Prescriptions     Pending Prescriptions Disp Refills    ESCITALOPRAM 10 MG Oral Tab [Pharmacy Med Name: escitalopram 10 mg tablet] 30 tablet 5     Sig: TAKE ONE TABLET BY MOUTH DAILY         Please approve if appropriate.     Thank you!

## 2024-12-09 RX ORDER — ESCITALOPRAM OXALATE 10 MG/1
10 TABLET ORAL DAILY
Qty: 30 TABLET | Refills: 0 | Status: SHIPPED | OUTPATIENT
Start: 2024-12-09

## 2024-12-16 ENCOUNTER — OFFICE VISIT (OUTPATIENT)
Dept: FAMILY MEDICINE CLINIC | Facility: CLINIC | Age: 24
End: 2024-12-16
Payer: COMMERCIAL

## 2024-12-16 VITALS
HEART RATE: 92 BPM | DIASTOLIC BLOOD PRESSURE: 72 MMHG | TEMPERATURE: 97 F | HEIGHT: 65.5 IN | RESPIRATION RATE: 14 BRPM | SYSTOLIC BLOOD PRESSURE: 110 MMHG | BODY MASS INDEX: 26.01 KG/M2 | WEIGHT: 158 LBS

## 2024-12-16 DIAGNOSIS — F41.1 GAD (GENERALIZED ANXIETY DISORDER): ICD-10-CM

## 2024-12-16 DIAGNOSIS — D50.9 IRON DEFICIENCY ANEMIA, UNSPECIFIED IRON DEFICIENCY ANEMIA TYPE: Primary | ICD-10-CM

## 2024-12-16 PROCEDURE — 3008F BODY MASS INDEX DOCD: CPT | Performed by: FAMILY MEDICINE

## 2024-12-16 PROCEDURE — 99213 OFFICE O/P EST LOW 20 MIN: CPT | Performed by: FAMILY MEDICINE

## 2024-12-16 PROCEDURE — 3078F DIAST BP <80 MM HG: CPT | Performed by: FAMILY MEDICINE

## 2024-12-16 PROCEDURE — 3074F SYST BP LT 130 MM HG: CPT | Performed by: FAMILY MEDICINE

## 2024-12-16 PROCEDURE — G2211 COMPLEX E/M VISIT ADD ON: HCPCS | Performed by: FAMILY MEDICINE

## 2024-12-16 RX ORDER — ESCITALOPRAM OXALATE 10 MG/1
10 TABLET ORAL DAILY
Qty: 90 TABLET | Refills: 1 | Status: SHIPPED | OUTPATIENT
Start: 2024-12-16

## 2024-12-16 NOTE — PATIENT INSTRUCTIONS
Continue current meds.   Healthy diet.  Stay active.     Refill policies:      Allow 3 business days for refills; controlled substances may take longer.  Contact your pharmacy at least 5-7 business days prior to running out of medication and have them send an electronic request or submit through the \"request refill\" option thru your [employer] account. No need to do both, as multiple requests will create an automated [employer] message to notify of a denial for one of the duplicated requests, causing you undue confusion.   Refills are NOT addressed on weekends; covering physicians do not authorize routine medications on weekends.  No narcotics or controlled substances are refilled after noon on Fridays or by on call physicians.  By law, narcotics cannot be faxed or phoned into your pharmacy.  If your prescription is due for a refill, you may be due for a follow up appointment. Please call our office at [phone] to make an appointment or schedule an appointment via [employer].  To best provide you care, patients receiving routine medications need to be seen at least twice a year. Patients receiving narcotic/controlled substance medications need to be seen at least once every 3 months.  In the event that your preferred pharmacy does not have the requested medication in stock (ie Backordered), it is your responsibility to find another pharmacy that has the requested medication available. We will gladly send a new prescription to that pharmacy at your request.  controlled substances may not be able to be filled out of state due to license restrictions.  If you have a planned trip, it's best to call your pharmacy at least 5-7 business days to prevent any delays in your medication refill.    Scheduling Tests:    If your physician has ordered radiology tests such as MRI or CT scans, please contact Central Scheduling at [phone] right away to schedule the test.  Once scheduled, the [facility] Centralized Referral Team will work  with your insurance carrier to obtain pre-certification or prior authorization.  Depending on your insurance carrier, approval may take 3-10 days.  It is highly recommended patients assure they have received an authorization before having a test performed.  If test is done without insurance authorization, patient may be responsible for the entire amount billed.      Precertification and Prior Authorizations:  If your physician has recommended that you have a procedure or additional testing performed the [facility] Centralized Referral Team will contact your insurance carrier to obtain pre-certification or prior authorization.    You are strongly encouraged to contact your insurance carrier to verify that your procedure/test has been approved and is a COVERED benefit.  Although the [facility] Centralized Referral Team does its due diligence, the insurance carrier gives the disclaimer that \"Although the procedure is authorized, this does not guarantee payment.\"    Ultimately the patient is responsible for payment.   Thank you for your understanding in this matter.  Paperwork Completion:  If you require FMLA or disability paperwork for your recovery, please make sure to either drop it off or have it faxed to our office at [phone]. Be sure the form has your name and date of birth on it.  The form will be faxed to our Forms Department and they will complete it for you.  There is a 25$ fee for all forms that need to be filled out.  Please be aware there is a 10-14 day turnaround time.  You will need to sign a release of information (INES) form if your paperwork does not come with one.  You may call the Forms Department with any questions at [phone].  Their fax number is [phone].

## 2024-12-16 NOTE — PROGRESS NOTES
Fallon Kessler is a 24 year old female.  Anxiety, anemia  HPI:   Anxiety patient is taking citalopram 20 mg daily doing well with medication mood is stable.  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  PHQ-9 score came back 0    On ANDREW-7 patient says that over the last 2 weeks several days she is feeling nervous anxious on the edge and becoming easily annoyed or irritable.  ANDREW-7 score came back at 2.  Will continue citalopram at current dose.  Current Outpatient Medications   Medication Sig Dispense Refill    escitalopram 10 MG Oral Tab Take 1 tablet (10 mg total) by mouth daily. Keep an appointment as scheduled. 90 tablet 1    Ferrous Sulfate 325 (65 Fe) MG Oral Tab Take 1 tablet (325 mg total) by mouth daily with breakfast.      Vitamin C 500 MG Oral Tab Take 1 tablet (500 mg total) by mouth daily.        Past Medical History:    Varicella without mention of complication      Social History:  Social History     Socioeconomic History    Marital status: Single   Tobacco Use    Smoking status: Never    Smokeless tobacco: Never   Vaping Use    Vaping status: Never Used   Substance and Sexual Activity    Alcohol use: No    Drug use: No    Sexual activity: Never   Other Topics Concern    Caffeine Concern No    Exercise Yes     Comment: daily in school    Seat Belt Yes        REVIEW OF SYSTEMS:   GENERAL HEALTH: feels well otherwise  SKIN: denies any unusual skin lesions or rashes  HEENT no congestion no runny nose no sore throat  Neck no neck pain   RESPIRATORY: denies shortness of breath with exertion  CARDIOVASCULAR: denies chest pain on exertion  GI: denies abdominal pain and denies heartburn  NEURO: denies headaches  Psych normal mood.    EXAM:   /72 (BP Location: Left arm, Patient Position: Sitting, Cuff Size: adult)   Pulse 92   Temp 97 °F (36.1 °C) (Temporal)   Resp 14   Ht 5' 5.5\" (1.664 m)   Wt 158 lb (71.7 kg)   LMP 03/30/2024 (Exact  Date)   BMI 25.89 kg/m²   GENERAL: well developed, well nourished,in no apparent distress  SKIN: no rashes,no suspicious lesions  HEENT: atraumatic, normocephalic,ears and throat are clear  NECK: supple,no adenopathy,  LUNGS: clear to auscultation  CARDIO: RRR without murmur  GI: good BS's,no masses, HSM or tenderness  EXTREMITIES: no  edema  Psychiatric - alert  and oriented x3, normal mood      ASSESSMENT AND PLAN:     Encounter Diagnoses   Name Primary?    ANDREW (generalized anxiety disorder)     Iron deficiency anemia, unspecified iron deficiency anemia type Yes       No orders of the defined types were placed in this encounter.      Meds & Refills for this Visit:  Requested Prescriptions     Signed Prescriptions Disp Refills    escitalopram 10 MG Oral Tab 90 tablet 1     Sig: Take 1 tablet (10 mg total) by mouth daily. Keep an appointment as scheduled.   Continue current meds.   Healthy diet.  Stay active.   Schedule physical for April 2025 to fasting blood work 1 week prior to visit.    Imaging & Consults:  None   The patient indicates understanding of these issues and agrees to the plan.  The patient is asked to return in April 2025 for complete physical.

## 2025-05-28 ENCOUNTER — OFFICE VISIT (OUTPATIENT)
Dept: FAMILY MEDICINE CLINIC | Facility: CLINIC | Age: 25
End: 2025-05-28
Payer: COMMERCIAL

## 2025-05-28 VITALS
TEMPERATURE: 97 F | RESPIRATION RATE: 16 BRPM | HEIGHT: 65.5 IN | BODY MASS INDEX: 25.52 KG/M2 | WEIGHT: 155 LBS | DIASTOLIC BLOOD PRESSURE: 70 MMHG | SYSTOLIC BLOOD PRESSURE: 110 MMHG | HEART RATE: 94 BPM

## 2025-05-28 DIAGNOSIS — Z00.00 PHYSICAL EXAM, ANNUAL: Primary | ICD-10-CM

## 2025-05-28 DIAGNOSIS — Z13.89 SCREENING FOR GENITOURINARY CONDITION: ICD-10-CM

## 2025-05-28 DIAGNOSIS — D50.9 IRON DEFICIENCY ANEMIA, UNSPECIFIED IRON DEFICIENCY ANEMIA TYPE: ICD-10-CM

## 2025-05-28 DIAGNOSIS — Z00.00 LABORATORY EXAMINATION ORDERED AS PART OF A ROUTINE GENERAL MEDICAL EXAMINATION: ICD-10-CM

## 2025-05-28 DIAGNOSIS — Z78.9 VEGETARIAN DIET: ICD-10-CM

## 2025-05-28 DIAGNOSIS — E55.9 VITAMIN D DEFICIENCY: ICD-10-CM

## 2025-05-28 PROCEDURE — 3074F SYST BP LT 130 MM HG: CPT | Performed by: FAMILY MEDICINE

## 2025-05-28 PROCEDURE — 3008F BODY MASS INDEX DOCD: CPT | Performed by: FAMILY MEDICINE

## 2025-05-28 PROCEDURE — 99395 PREV VISIT EST AGE 18-39: CPT | Performed by: FAMILY MEDICINE

## 2025-05-28 PROCEDURE — 3078F DIAST BP <80 MM HG: CPT | Performed by: FAMILY MEDICINE

## 2025-05-28 NOTE — PROGRESS NOTES
HPI:   Fallon Kessler is a 25 year old female who presents for a complete physical exam.    The following individual(s) verbally consented to be recorded using ambient AI listening technology and understand that they can each withdraw their consent to this listening technology at any point by asking the clinician to turn off or pause the recording:    Patient name: Fallon Kessler    History of Present Illness  Fallon Kessler is a 25 year old female who presents for an annual physical exam.    She has experienced a weight decrease from 161 pounds in February to 155 pounds currently, which she attributes to possible fluctuations and changes in clothing. She has been attempting to increase her physical activity by walking more and occasionally using a stationary bike at home, though she has not been consistent with the latter.    Her diet is described as 'decent,' and she continues to live with her parents, which she feels helps her maintain a healthy diet. She remains vegetarian and ensures she includes protein sources such as eggs, cheese, milk, nuts, and beans. She has been on iron pills but has not taken them recently due to running out.    She has a history of low vitamin D levels and is unsure about her current dietary adequacy. She reports normal energy levels and denies feeling tired or fatigued. She sleeps 7-8 hours per night and maintains good hydration.    No chest pain, shortness of breath, or other respiratory symptoms. Bowel movements and urination are normal, and she reports no skin issues or moles.    Her vaccinations are up to date, including tetanus in 2022, HPV, and hepatitis A. She anticipates getting a flu shot and COVID vaccine in the fall.    Symptoms: denies discharge, itching, burning or dysuria. Patient got new job.  Excited about that.  Was planning for [employer].    Had low iron before.  Check blood work.     Patient has vegetarian diet.  Will check B12  level.    Immunization History   Administered Date(s) Administered    Covid-19 Vaccine Pfizer 30 mcg/0.3 ml 03/22/2021, 04/10/2021, 01/13/2022    Covid-19 Vaccine Pfizer Bivalent 30mcg/0.3mL 03/06/2023    DTAP 05/23/2001, 07/25/2001, 10/03/2001, 10/16/2002, 04/03/2006    FLULAVAL 6 months & older 0.5 ml Prefilled syringe (42347) 12/17/2021, 11/25/2022    FLUZONE 6 months and older PFS 0.5 ml ([id number]) 10/29/2018, 11/20/2023    HEP A 04/05/2010, 04/18/2012    HEP B 05/23/2001, 07/25/2001, 10/16/2002    HIB 05/23/2001, 07/25/2001, 10/03/2001, 06/28/2002    Hpv Virus Vaccine 9 Vee Im 08/03/2022, 04/22/2024, 02/24/2025    Influenza 11/04/2011    Influenza(Afluria)0.5ml QIV PFS 09/12/2020    MMR 06/28/2002, 04/03/2006    Meningococcal-Menactra 04/18/2012, 07/27/2018    OPV 05/23/2001, 07/25/2001, 10/16/2002, 04/03/2006    TDAP 04/18/2012, 08/03/2022      Wt Readings from Last 6 Encounters:   05/28/25 155 lb (70.3 kg)   02/24/25 161 lb (73 kg)   12/16/24 158 lb (71.7 kg)   04/22/24 154 lb 12.8 oz (70.2 kg)   11/20/23 145 lb (65.8 kg)   09/01/23 137 lb (62.1 kg)     Body mass index is 25.4 kg/m².     AST (U/L)   Date Value   12/22/2022 12 (L)   06/26/2017 26   02/15/2017 22     ALT (U/L)   Date Value   12/22/2022 18   06/26/2017 20   02/15/2017 21      No results found for: \"GLUCOSE\"     Current Outpatient Medications   Medication Sig Dispense Refill    escitalopram 10 MG Oral Tab Take 1 tablet (10 mg total) by mouth daily. Keep an appointment as scheduled. 90 tablet 1    Ferrous Sulfate 325 (65 Fe) MG Oral Tab Take 1 tablet (325 mg total) by mouth daily with breakfast.      Vitamin C 500 MG Oral Tab Take 1 tablet (500 mg total) by mouth daily.        Past Medical History:    Varicella without mention of complication      Past Surgical History:   Procedure Laterality Date    Remove tonsils/adenoids,<12 y/o  12/29/09    Performed by POLLY TEMPLETON at [facility], [facility]    Tonsillectomy        No family history on  file.   Social History:   Social History     Socioeconomic History    Marital status: Single   Tobacco Use    Smoking status: Never    Smokeless tobacco: Never   Vaping Use    Vaping status: Never Used   Substance and Sexual Activity    Alcohol use: No    Drug use: No    Sexual activity: Never   Other Topics Concern    Caffeine Concern No    Exercise Yes     Comment: daily in school    Seat Belt Yes     Occ:office.  no Children: none  Exercise: walking.  Diet: watches calories closely     REVIEW OF SYSTEMS:   GENERAL: feels well otherwise  SKIN: denies any unusual skin lesions  EYES:denies blurred vision or double vision  HEENT: denies nasal congestion, sinus pain or ST  LUNGS: denies shortness of breath with exertion  CARDIOVASCULAR: denies chest pain on exertion  GI: denies abdominal pain,denies heartburn  : denies dysuria, vaginal discharge or itching,periods regular   MUSCULOSKELETAL: denies back pain  NEURO: denies headaches  PSYCHE: denies depression or anxiety  HEMATOLOGIC: denies hx of anemia  ENDOCRINE: denies thyroid history  ALL/ASTHMA: denies hx of allergy or asthma    EXAM:   /70 (BP Location: Left arm, Patient Position: Sitting, Cuff Size: adult)   Pulse 94   Temp 97.2 °F (36.2 °C) (Temporal)   Resp 16   Ht 5' 5.5\" (1.664 m)   Wt 155 lb (70.3 kg)   LMP 05/21/2025 (Exact Date)   BMI 25.40 kg/m²   Body mass index is 25.4 kg/m².   GENERAL: well developed, well nourished,in no apparent distress  SKIN: no rashes,no suspicious lesions  HEENT: atraumatic, normocephalic,ears and throat are clear  EYES:PERRLA, EOMI, conjunctiva are clear  NECK: supple,no adenopathy  CHEST: no chest tenderness  BREAST: no dominant or suspicious mass  LUNGS: clear to auscultation  CARDIO: RRR without murmur  GI: good BS's,no masses, HSM or tenderness  :deferred  RECTAL: Deferred  MUSCULOSKELETAL: back is not tender,FROM of the back  EXTREMITIES: no cyanosis, clubbing or edema  NEURO: Oriented times  three,cranial nerves are intact,motor and sensory are grossly intact    ASSESSMENT AND PLAN:   Fallon Kessler is a 25 year old female who presents for a complete physical exam.  Encounter Diagnoses   Name Primary?    Physical exam, annual Yes    Laboratory examination ordered as part of a routine general medical examination     Screening for genitourinary condition     Iron deficiency anemia, unspecified iron deficiency anemia type     Vegetarian diet     Vitamin D deficiency        Orders Placed This Encounter   Procedures    CBC With Differential With Platelet    Comp Metabolic Panel (14)    Lipid Panel    Urinalysis with Culture Reflex    TSH W Reflex To Free T4    Ferritin    Iron And Tibc    Vitamin D    Vitamin B12       Meds & Refills for this Visit:  Requested Prescriptions      No prescriptions requested or ordered in this encounter   Call [phone] to schedule fasting labs.   Healthy diet.  Stay active.     VISIT SUMMARY:  Today, you came in for your annual physical exam. We discussed your recent weight change, physical activity, diet, and overall health. We also reviewed your history of low iron and vitamin D levels, and your vaccination status.    YOUR PLAN:  -GENERAL HEALTH MAINTENANCE: We reviewed your diet, physical activity, hydration, sleep, and vaccinations. It's important to continue regular exercise, ensure adequate protein intake in your vegetarian diet, stay hydrated with 64 ounces of water daily, and get 7-8 hours of sleep per night. We will also perform routine blood work to check cholesterol, electrolytes, thyroid function, blood counts, and B12 levels. Additionally, we performed a breast examination and discussed self-examination, and we will schedule a Pap smear when you become sexually active and a colonoscopy at age 46. You should get a flu shot and COVID vaccine in the fall.    -ANEMIA: Anemia is a condition where you don't have enough healthy red blood cells to carry adequate  oxygen to your body's tissues. We need to confirm your iron levels through blood work to determine if you need to continue iron supplementation.    -VITAMIN D DEFICIENCY: Vitamin D deficiency means you don't have enough vitamin D in your body, which is important for bone health and immune function. We will check your vitamin D levels through blood work to see if you need supplementation.      INSTRUCTIONS:  Please schedule a blood work appointment with central scheduling and bring someone with you due to your history of fainting. Follow up in the fall for medication refills.    Contains text generated by Camila     Imaging & Consults:  None   Pap and pelvic deferred . self breast exam explained. Health maintenance, will check fasting Lipids, CMP, and CBC. Pt  will be at 46 referred for screening colonoscopy. Pt' s weight is Body mass index is 25.4 kg/m²., recommended low carb diet and aerobic exercise 30 minutes three times weekly.  The patient indicates understanding of these issues and agrees to the plan.  The patient is asked to return for CPX in 1 year.  Medication check in 3-6 months.

## 2025-05-28 NOTE — PATIENT INSTRUCTIONS
Call [phone] to schedule fasting labs.   Healthy diet.  Stay active.     VISIT SUMMARY:  Today, you came in for your annual physical exam. We discussed your recent weight change, physical activity, diet, and overall health. We also reviewed your history of low iron and vitamin D levels, and your vaccination status.    YOUR PLAN:  -GENERAL HEALTH MAINTENANCE: We reviewed your diet, physical activity, hydration, sleep, and vaccinations. It's important to continue regular exercise, ensure adequate protein intake in your vegetarian diet, stay hydrated with 64 ounces of water daily, and get 7-8 hours of sleep per night. We will also perform routine blood work to check cholesterol, electrolytes, thyroid function, blood counts, and B12 levels. Additionally, we performed a breast examination and discussed self-examination, and we will schedule a Pap smear when you become sexually active and a colonoscopy at age 46. You should get a flu shot and COVID vaccine in the fall.    -ANEMIA: Anemia is a condition where you don't have enough healthy red blood cells to carry adequate oxygen to your body's tissues. We need to confirm your iron levels through blood work to determine if you need to continue iron supplementation.    -VITAMIN D DEFICIENCY: Vitamin D deficiency means you don't have enough vitamin D in your body, which is important for bone health and immune function. We will check your vitamin D levels through blood work to see if you need supplementation.      INSTRUCTIONS:  Please schedule a blood work appointment with central scheduling and bring someone with you due to your history of fainting. Follow up in the fall for medication refills.    Contains text generated by Camila

## (undated) NOTE — MR AVS SNAPSHOT
[facility] [address], [address] [id number] [phone]               Thank you for choosing us for your health care visit with Constanza Bennett MD.  We are glad to serve you and happy to provide you with this moses This list is accurate as of: 4/21/17 10:38 AM.  Always use your most recent med list.                Cholecalciferol 4000 units Caps   Take 1 capsule by mouth daily. Fluticasone Propionate 50 MCG/ACT Susp   2 sprays by Each Nare route daily.    Co

## (undated) NOTE — MR AVS SNAPSHOT
[facility] [address], [address] [address] [phone]               Thank you for choosing us for your health care visit with Rula Granda MD.  We are glad to serve you and happy to provide you with this moses Muscle twitch [R25.3]           COMP METABOLIC PANEL    Complete by:  Feb 15, 2017 (Approximate)    Assoc Dx:   Fatigue, unspecified type [R53.83], Muscle twitch [R25.3]           TSH W REFLEX TO FREE T4    Complete by:  Feb 15, 2017 (Approximate)    Assoc o 5 servings of fruits and vegetables a day  o 4 servings of water a day  o 3 servings of low-fat dairy a day  o 2 or less hours of screen time a day  o 1 or more hours of physical activity a day    To help children live healthy active lives, parents can: